# Patient Record
Sex: MALE | NOT HISPANIC OR LATINO | URBAN - METROPOLITAN AREA
[De-identification: names, ages, dates, MRNs, and addresses within clinical notes are randomized per-mention and may not be internally consistent; named-entity substitution may affect disease eponyms.]

---

## 2017-10-28 ENCOUNTER — EMERGENCY (EMERGENCY)
Facility: HOSPITAL | Age: 57
LOS: 1 days | Discharge: HOME | End: 2017-10-28
Attending: EMERGENCY MEDICINE | Admitting: EMERGENCY MEDICINE
Payer: COMMERCIAL

## 2017-10-28 VITALS
OXYGEN SATURATION: 100 % | SYSTOLIC BLOOD PRESSURE: 159 MMHG | HEART RATE: 72 BPM | DIASTOLIC BLOOD PRESSURE: 113 MMHG | RESPIRATION RATE: 18 BRPM | TEMPERATURE: 99 F

## 2017-10-28 VITALS
OXYGEN SATURATION: 99 % | RESPIRATION RATE: 18 BRPM | SYSTOLIC BLOOD PRESSURE: 163 MMHG | HEART RATE: 92 BPM | DIASTOLIC BLOOD PRESSURE: 92 MMHG | TEMPERATURE: 99 F

## 2017-10-28 DIAGNOSIS — Z98.890 OTHER SPECIFIED POSTPROCEDURAL STATES: Chronic | ICD-10-CM

## 2017-10-28 LAB
ALBUMIN SERPL ELPH-MCNC: 4.4 G/DL — SIGNIFICANT CHANGE UP (ref 3.3–5)
ALP SERPL-CCNC: 56 U/L — SIGNIFICANT CHANGE UP (ref 40–120)
ALT FLD-CCNC: 29 U/L RC — SIGNIFICANT CHANGE UP (ref 10–45)
ANION GAP SERPL CALC-SCNC: 11 MMOL/L — SIGNIFICANT CHANGE UP (ref 5–17)
APPEARANCE UR: CLEAR — SIGNIFICANT CHANGE UP
AST SERPL-CCNC: 27 U/L — SIGNIFICANT CHANGE UP (ref 10–40)
BASOPHILS # BLD AUTO: 0 K/UL — SIGNIFICANT CHANGE UP (ref 0–0.2)
BASOPHILS NFR BLD AUTO: 0.2 % — SIGNIFICANT CHANGE UP (ref 0–2)
BILIRUB SERPL-MCNC: 0.4 MG/DL — SIGNIFICANT CHANGE UP (ref 0.2–1.2)
BILIRUB UR-MCNC: NEGATIVE — SIGNIFICANT CHANGE UP
BUN SERPL-MCNC: 16 MG/DL — SIGNIFICANT CHANGE UP (ref 7–23)
CALCIUM SERPL-MCNC: 9.3 MG/DL — SIGNIFICANT CHANGE UP (ref 8.4–10.5)
CHLORIDE SERPL-SCNC: 104 MMOL/L — SIGNIFICANT CHANGE UP (ref 96–108)
CO2 SERPL-SCNC: 27 MMOL/L — SIGNIFICANT CHANGE UP (ref 22–31)
COLOR SPEC: YELLOW — SIGNIFICANT CHANGE UP
CREAT SERPL-MCNC: 0.97 MG/DL — SIGNIFICANT CHANGE UP (ref 0.5–1.3)
DIFF PNL FLD: ABNORMAL
EOSINOPHIL # BLD AUTO: 0 K/UL — SIGNIFICANT CHANGE UP (ref 0–0.5)
EOSINOPHIL NFR BLD AUTO: 0.4 % — SIGNIFICANT CHANGE UP (ref 0–6)
EPI CELLS # UR: SIGNIFICANT CHANGE UP /HPF
GLUCOSE SERPL-MCNC: 109 MG/DL — HIGH (ref 70–99)
GLUCOSE UR QL: NEGATIVE — SIGNIFICANT CHANGE UP
HCT VFR BLD CALC: 42.2 % — SIGNIFICANT CHANGE UP (ref 39–50)
HGB BLD-MCNC: 14.2 G/DL — SIGNIFICANT CHANGE UP (ref 13–17)
KETONES UR-MCNC: NEGATIVE — SIGNIFICANT CHANGE UP
LEUKOCYTE ESTERASE UR-ACNC: NEGATIVE — SIGNIFICANT CHANGE UP
LYMPHOCYTES # BLD AUTO: 1.8 K/UL — SIGNIFICANT CHANGE UP (ref 1–3.3)
LYMPHOCYTES # BLD AUTO: 20.3 % — SIGNIFICANT CHANGE UP (ref 13–44)
MCHC RBC-ENTMCNC: 31.1 PG — SIGNIFICANT CHANGE UP (ref 27–34)
MCHC RBC-ENTMCNC: 33.7 GM/DL — SIGNIFICANT CHANGE UP (ref 32–36)
MCV RBC AUTO: 92.3 FL — SIGNIFICANT CHANGE UP (ref 80–100)
MONOCYTES # BLD AUTO: 0.6 K/UL — SIGNIFICANT CHANGE UP (ref 0–0.9)
MONOCYTES NFR BLD AUTO: 6.9 % — SIGNIFICANT CHANGE UP (ref 2–14)
NEUTROPHILS # BLD AUTO: 6.4 K/UL — SIGNIFICANT CHANGE UP (ref 1.8–7.4)
NEUTROPHILS NFR BLD AUTO: 72.3 % — SIGNIFICANT CHANGE UP (ref 43–77)
NITRITE UR-MCNC: NEGATIVE — SIGNIFICANT CHANGE UP
PH UR: 6 — SIGNIFICANT CHANGE UP (ref 5–8)
PLATELET # BLD AUTO: 176 K/UL — SIGNIFICANT CHANGE UP (ref 150–400)
POTASSIUM SERPL-MCNC: 3.6 MMOL/L — SIGNIFICANT CHANGE UP (ref 3.5–5.3)
POTASSIUM SERPL-SCNC: 3.6 MMOL/L — SIGNIFICANT CHANGE UP (ref 3.5–5.3)
PROT SERPL-MCNC: 7 G/DL — SIGNIFICANT CHANGE UP (ref 6–8.3)
PROT UR-MCNC: SIGNIFICANT CHANGE UP
RBC # BLD: 4.57 M/UL — SIGNIFICANT CHANGE UP (ref 4.2–5.8)
RBC # FLD: 11.7 % — SIGNIFICANT CHANGE UP (ref 10.3–14.5)
RBC CASTS # UR COMP ASSIST: SIGNIFICANT CHANGE UP /HPF (ref 0–2)
SODIUM SERPL-SCNC: 142 MMOL/L — SIGNIFICANT CHANGE UP (ref 135–145)
SP GR SPEC: 1.01 — SIGNIFICANT CHANGE UP (ref 1.01–1.02)
UROBILINOGEN FLD QL: NEGATIVE — SIGNIFICANT CHANGE UP
WBC # BLD: 8.9 K/UL — SIGNIFICANT CHANGE UP (ref 3.8–10.5)
WBC # FLD AUTO: 8.9 K/UL — SIGNIFICANT CHANGE UP (ref 3.8–10.5)
WBC UR QL: SIGNIFICANT CHANGE UP /HPF (ref 0–5)

## 2017-10-28 PROCEDURE — 96375 TX/PRO/DX INJ NEW DRUG ADDON: CPT

## 2017-10-28 PROCEDURE — 74176 CT ABD & PELVIS W/O CONTRAST: CPT | Mod: 26

## 2017-10-28 PROCEDURE — 85027 COMPLETE CBC AUTOMATED: CPT

## 2017-10-28 PROCEDURE — 96374 THER/PROPH/DIAG INJ IV PUSH: CPT

## 2017-10-28 PROCEDURE — 80053 COMPREHEN METABOLIC PANEL: CPT

## 2017-10-28 PROCEDURE — 74176 CT ABD & PELVIS W/O CONTRAST: CPT

## 2017-10-28 PROCEDURE — 99285 EMERGENCY DEPT VISIT HI MDM: CPT

## 2017-10-28 PROCEDURE — 93005 ELECTROCARDIOGRAM TRACING: CPT

## 2017-10-28 PROCEDURE — 99284 EMERGENCY DEPT VISIT MOD MDM: CPT | Mod: 25

## 2017-10-28 PROCEDURE — 81001 URINALYSIS AUTO W/SCOPE: CPT

## 2017-10-28 RX ORDER — ONDANSETRON 8 MG/1
4 TABLET, FILM COATED ORAL ONCE
Qty: 0 | Refills: 0 | Status: COMPLETED | OUTPATIENT
Start: 2017-10-28 | End: 2017-10-28

## 2017-10-28 RX ORDER — KETOROLAC TROMETHAMINE 30 MG/ML
15 SYRINGE (ML) INJECTION ONCE
Qty: 0 | Refills: 0 | Status: DISCONTINUED | OUTPATIENT
Start: 2017-10-28 | End: 2017-10-28

## 2017-10-28 RX ORDER — SODIUM CHLORIDE 9 MG/ML
1000 INJECTION INTRAMUSCULAR; INTRAVENOUS; SUBCUTANEOUS ONCE
Qty: 0 | Refills: 0 | Status: COMPLETED | OUTPATIENT
Start: 2017-10-28 | End: 2017-10-28

## 2017-10-28 RX ADMIN — ONDANSETRON 4 MILLIGRAM(S): 8 TABLET, FILM COATED ORAL at 13:05

## 2017-10-28 RX ADMIN — Medication 15 MILLIGRAM(S): at 13:05

## 2017-10-28 RX ADMIN — SODIUM CHLORIDE 3000 MILLILITER(S): 9 INJECTION INTRAMUSCULAR; INTRAVENOUS; SUBCUTANEOUS at 13:05

## 2017-10-28 NOTE — ED PROVIDER NOTE - MEDICAL DECISION MAKING DETAILS
Resident: right flank, right lower quadrant pain, intermittent. Denies fevers. Likely biliary colic, unlikely appendicitis. CT, labs, fluids, pain control.

## 2017-10-28 NOTE — ED PROVIDER NOTE - OBJECTIVE STATEMENT
Resident: 56y M PMH nephrolithiasis presents with right flank pain, right lower quadrant pain x 2 days. Started yesterday morning when he work up, described as sharp, 10/10 at maximum, intermittent, lasting minutes, associated with nausea, not taking anything for pain. Went to urgent care yesterday, told him urine had blood in it, sent him to emergency department for evaluation. Hx nephrolithiasis 1.5yrs ago, admitted to [facility], followed-up with uro after discharge. Hx Gu[place], pectus excavatum. Patient reports intermittent right arm numbness for past week.    Uro Jareth Mac

## 2017-10-28 NOTE — ED PROVIDER NOTE - PHYSICAL EXAMINATION
Resident: Gen: well appearing, of stated age, no acute distress; Head: NC, AT; ENT: PERRL, MMM; Chest: CTAB, no retractions, rate normal, appears to breathe comfortably; Heart: RRR S1S2 No JVD No peripheral edema b/l pulses 2+ in arms and legs; Abd: Soft, mild right lower quadrant tenderness to palpation, no rebound or guarding, no masses, no modi sign, no mcburney tenderness, no CVAT; Back: No spinal deformity, +right flank tenderness to palpation; Ext: Moving all 4 extremities without obvious impairment to ROM, no obvious weakness; Neuro: fluid speech; Psych: No anxiety, depression or pressured speech noted; Skin: no urticaria, no diffuse rash.

## 2017-10-28 NOTE — ED PROVIDER NOTE - PLAN OF CARE
You may use Tylenol 650mg or Motrin 600mg every 8 hours as needed for pain. For severe pain not controlled with these medications, you may use the prescription pain medication Oxycodone, please do not drink alcohol or drive on this medication as it can make you very sleepy.     Please take the flomax every night until directed to stop by our urologist doctors.     Please strain your urine to help identify the kidney stone when it passes.      Return for pain not controlled with the pain medications, repetitive vomiting or fevers.     Please call the number provided to make a follow up appointment with our urologists in 3-5 days.

## 2017-10-28 NOTE — ED PROVIDER NOTE - NS ED ROS FT
Constitutional: no fever, +chills.  Eyes: no visual changes.  ENMT: no sore throat.  CV: no chest pain.  Resp: no cough, no shortness of breath.  GI: +abdominal pain, +nausea, no vomiting, no diarrhea.  : no dysuria, no hematuria.  MSK: no back pain, no neck pain.  Skin: no rashes.  Endo: no diabetes, no thyroid trouble.

## 2017-10-28 NOTE — ED PROVIDER NOTE - ATTENDING CONTRIBUTION TO CARE
Attending MD Anna:  I personally have seen and examined this patient.  Resident note reviewed and agree on plan of care and except where noted.  See HPI, PE, and MDM for details.       Attending MD Anna: A & O x 3, NAD, EOMI b/l, PERRL b/l; lungs CTAB, heart with reg rhythm without murmur; abdomen soft NTND; mild R CVAT, extremities with no edema; affect appropriate. neuro exam non focal with no motor or sensory deficits.      56M with right flank and back pain, mild CVAT on R on exam, ddx includes ureteral colic, uti/pyelo. Will obtain UA, CT a/p, re-eval

## 2017-10-28 NOTE — ED PROVIDER NOTE - CARE PLAN
Principal Discharge DX:	Kidney stone  Instructions for follow-up, activity and diet:	You may use Tylenol 650mg or Motrin 600mg every 8 hours as needed for pain. For severe pain not controlled with these medications, you may use the prescription pain medication Oxycodone, please do not drink alcohol or drive on this medication as it can make you very sleepy.     Please take the flomax every night until directed to stop by our urologist doctors.     Please strain your urine to help identify the kidney stone when it passes.      Return for pain not controlled with the pain medications, repetitive vomiting or fevers.     Please call the number provided to make a follow up appointment with our urologists in 3-5 days.

## 2017-10-31 ENCOUNTER — INPATIENT (INPATIENT)
Facility: HOSPITAL | Age: 57
LOS: 1 days | Discharge: HOME | DRG: 660 | End: 2017-11-02
Attending: INTERNAL MEDICINE | Admitting: INTERNAL MEDICINE
Payer: COMMERCIAL

## 2017-10-31 VITALS
RESPIRATION RATE: 18 BRPM | DIASTOLIC BLOOD PRESSURE: 97 MMHG | OXYGEN SATURATION: 99 % | SYSTOLIC BLOOD PRESSURE: 183 MMHG | TEMPERATURE: 98 F | WEIGHT: 169.98 LBS | HEART RATE: 85 BPM

## 2017-10-31 DIAGNOSIS — Z98.890 OTHER SPECIFIED POSTPROCEDURAL STATES: Chronic | ICD-10-CM

## 2017-10-31 PROBLEM — Z00.00 ENCOUNTER FOR PREVENTIVE HEALTH EXAMINATION: Status: ACTIVE | Noted: 2017-10-31

## 2017-10-31 PROBLEM — Z00.00 ENCOUNTER FOR PREVENTIVE HEALTH EXAMINATION: Noted: 2017-10-31

## 2017-10-31 LAB
ALBUMIN SERPL ELPH-MCNC: 4.2 G/DL — SIGNIFICANT CHANGE UP (ref 3.3–5)
ALP SERPL-CCNC: 65 U/L — SIGNIFICANT CHANGE UP (ref 40–120)
ALT FLD-CCNC: 23 U/L RC — SIGNIFICANT CHANGE UP (ref 10–45)
ANION GAP SERPL CALC-SCNC: 15 MMOL/L — SIGNIFICANT CHANGE UP (ref 5–17)
APPEARANCE UR: CLEAR — SIGNIFICANT CHANGE UP
AST SERPL-CCNC: 16 U/L — SIGNIFICANT CHANGE UP (ref 10–40)
BASOPHILS # BLD AUTO: 0 K/UL — SIGNIFICANT CHANGE UP (ref 0–0.2)
BASOPHILS NFR BLD AUTO: 0.2 % — SIGNIFICANT CHANGE UP (ref 0–2)
BILIRUB SERPL-MCNC: 0.3 MG/DL — SIGNIFICANT CHANGE UP (ref 0.2–1.2)
BILIRUB UR-MCNC: NEGATIVE — SIGNIFICANT CHANGE UP
BUN SERPL-MCNC: 16 MG/DL — SIGNIFICANT CHANGE UP (ref 7–23)
CALCIUM SERPL-MCNC: 9.4 MG/DL — SIGNIFICANT CHANGE UP (ref 8.4–10.5)
CHLORIDE SERPL-SCNC: 99 MMOL/L — SIGNIFICANT CHANGE UP (ref 96–108)
CO2 SERPL-SCNC: 26 MMOL/L — SIGNIFICANT CHANGE UP (ref 22–31)
COLOR SPEC: SIGNIFICANT CHANGE UP
CREAT SERPL-MCNC: 1.02 MG/DL — SIGNIFICANT CHANGE UP (ref 0.5–1.3)
DIFF PNL FLD: ABNORMAL
EOSINOPHIL # BLD AUTO: 0 K/UL — SIGNIFICANT CHANGE UP (ref 0–0.5)
EOSINOPHIL NFR BLD AUTO: 0.2 % — SIGNIFICANT CHANGE UP (ref 0–6)
GLUCOSE SERPL-MCNC: 113 MG/DL — HIGH (ref 70–99)
GLUCOSE UR QL: NEGATIVE — SIGNIFICANT CHANGE UP
HCT VFR BLD CALC: 41.3 % — SIGNIFICANT CHANGE UP (ref 39–50)
HGB BLD-MCNC: 14.3 G/DL — SIGNIFICANT CHANGE UP (ref 13–17)
KETONES UR-MCNC: NEGATIVE — SIGNIFICANT CHANGE UP
LEUKOCYTE ESTERASE UR-ACNC: NEGATIVE — SIGNIFICANT CHANGE UP
LYMPHOCYTES # BLD AUTO: 1.2 K/UL — SIGNIFICANT CHANGE UP (ref 1–3.3)
LYMPHOCYTES # BLD AUTO: 10.8 % — LOW (ref 13–44)
MCHC RBC-ENTMCNC: 31.4 PG — SIGNIFICANT CHANGE UP (ref 27–34)
MCHC RBC-ENTMCNC: 34.5 GM/DL — SIGNIFICANT CHANGE UP (ref 32–36)
MCV RBC AUTO: 90.9 FL — SIGNIFICANT CHANGE UP (ref 80–100)
MONOCYTES # BLD AUTO: 0.8 K/UL — SIGNIFICANT CHANGE UP (ref 0–0.9)
MONOCYTES NFR BLD AUTO: 7.4 % — SIGNIFICANT CHANGE UP (ref 2–14)
NEUTROPHILS # BLD AUTO: 8.9 K/UL — HIGH (ref 1.8–7.4)
NEUTROPHILS NFR BLD AUTO: 81.5 % — HIGH (ref 43–77)
NITRITE UR-MCNC: NEGATIVE — SIGNIFICANT CHANGE UP
PH UR: 7 — SIGNIFICANT CHANGE UP (ref 5–8)
PLATELET # BLD AUTO: 189 K/UL — SIGNIFICANT CHANGE UP (ref 150–400)
POTASSIUM SERPL-MCNC: 3.8 MMOL/L — SIGNIFICANT CHANGE UP (ref 3.5–5.3)
POTASSIUM SERPL-SCNC: 3.8 MMOL/L — SIGNIFICANT CHANGE UP (ref 3.5–5.3)
PROT SERPL-MCNC: 7.2 G/DL — SIGNIFICANT CHANGE UP (ref 6–8.3)
PROT UR-MCNC: NEGATIVE — SIGNIFICANT CHANGE UP
RBC # BLD: 4.55 M/UL — SIGNIFICANT CHANGE UP (ref 4.2–5.8)
RBC # FLD: 11.6 % — SIGNIFICANT CHANGE UP (ref 10.3–14.5)
RBC CASTS # UR COMP ASSIST: SIGNIFICANT CHANGE UP /HPF (ref 0–2)
SODIUM SERPL-SCNC: 140 MMOL/L — SIGNIFICANT CHANGE UP (ref 135–145)
SP GR SPEC: 1.01 — LOW (ref 1.01–1.02)
UROBILINOGEN FLD QL: NEGATIVE — SIGNIFICANT CHANGE UP
WBC # BLD: 10.9 K/UL — HIGH (ref 3.8–10.5)
WBC # FLD AUTO: 10.9 K/UL — HIGH (ref 3.8–10.5)
WBC UR QL: SIGNIFICANT CHANGE UP /HPF (ref 0–5)

## 2017-10-31 PROCEDURE — 99220: CPT

## 2017-10-31 RX ORDER — HYDROMORPHONE HYDROCHLORIDE 2 MG/ML
0.5 INJECTION INTRAMUSCULAR; INTRAVENOUS; SUBCUTANEOUS ONCE
Qty: 0 | Refills: 0 | Status: DISCONTINUED | OUTPATIENT
Start: 2017-10-31 | End: 2017-10-31

## 2017-10-31 RX ORDER — SODIUM CHLORIDE 9 MG/ML
1000 INJECTION INTRAMUSCULAR; INTRAVENOUS; SUBCUTANEOUS
Qty: 0 | Refills: 0 | Status: DISCONTINUED | OUTPATIENT
Start: 2017-10-31 | End: 2017-11-02

## 2017-10-31 RX ORDER — TAMSULOSIN HYDROCHLORIDE 0.4 MG/1
0.4 CAPSULE ORAL DAILY
Qty: 0 | Refills: 0 | Status: DISCONTINUED | OUTPATIENT
Start: 2017-10-31 | End: 2017-11-02

## 2017-10-31 RX ORDER — OXYCODONE HYDROCHLORIDE 5 MG/1
5 TABLET ORAL EVERY 4 HOURS
Qty: 0 | Refills: 0 | Status: DISCONTINUED | OUTPATIENT
Start: 2017-10-31 | End: 2017-11-02

## 2017-10-31 RX ORDER — KETOROLAC TROMETHAMINE 30 MG/ML
30 SYRINGE (ML) INJECTION ONCE
Qty: 0 | Refills: 0 | Status: DISCONTINUED | OUTPATIENT
Start: 2017-10-31 | End: 2017-10-31

## 2017-10-31 RX ORDER — SODIUM CHLORIDE 9 MG/ML
2000 INJECTION INTRAMUSCULAR; INTRAVENOUS; SUBCUTANEOUS ONCE
Qty: 0 | Refills: 0 | Status: COMPLETED | OUTPATIENT
Start: 2017-10-31 | End: 2017-10-31

## 2017-10-31 RX ORDER — KETOROLAC TROMETHAMINE 30 MG/ML
30 SYRINGE (ML) INJECTION EVERY 6 HOURS
Qty: 0 | Refills: 0 | Status: DISCONTINUED | OUTPATIENT
Start: 2017-10-31 | End: 2017-11-02

## 2017-10-31 RX ORDER — ACETAMINOPHEN 500 MG
1000 TABLET ORAL ONCE
Qty: 0 | Refills: 0 | Status: COMPLETED | OUTPATIENT
Start: 2017-10-31 | End: 2017-10-31

## 2017-10-31 RX ADMIN — HYDROMORPHONE HYDROCHLORIDE 0.5 MILLIGRAM(S): 2 INJECTION INTRAMUSCULAR; INTRAVENOUS; SUBCUTANEOUS at 21:35

## 2017-10-31 RX ADMIN — Medication 400 MILLIGRAM(S): at 21:05

## 2017-10-31 RX ADMIN — Medication 30 MILLIGRAM(S): at 21:35

## 2017-10-31 RX ADMIN — Medication 1000 MILLIGRAM(S): at 21:35

## 2017-10-31 RX ADMIN — HYDROMORPHONE HYDROCHLORIDE 0.5 MILLIGRAM(S): 2 INJECTION INTRAMUSCULAR; INTRAVENOUS; SUBCUTANEOUS at 21:05

## 2017-10-31 RX ADMIN — Medication 30 MILLIGRAM(S): at 21:05

## 2017-10-31 RX ADMIN — SODIUM CHLORIDE 1000 MILLILITER(S): 9 INJECTION INTRAMUSCULAR; INTRAVENOUS; SUBCUTANEOUS at 23:01

## 2017-10-31 NOTE — ED PROVIDER NOTE - ATTENDING CONTRIBUTION TO CARE
Patient presenting with R flank pain, diagnosed with 3mm UVJ stone on CT 3 days ago, not responding to oxycodone at home.  On exam patient very uncomfortable, vital signs within normal limits, benign abdominal exam.  Renal colic failing outpatient management/treatment, will repeat labs, pain control, likely CDU for observation for continued pain control given failure of management to date.

## 2017-10-31 NOTE — ED CDU PROVIDER INITIAL DAY NOTE - OBJECTIVE STATEMENT
56yom pmhx of kidney stone (passed without surgery) seen in the ER 2 days ago for renal colic and dx with 3mm distal stone, sent home on oxycodone, flomax and zofran, returns with c/o worsening right flank pain radiating to right lower quadrant and testicle. pt reports pain initially started 5 days ago and progressively worsened when became severe 2 days ago so came in. +urinary frequency +urinary hesitancy +myalgias +chills  denies fever, diarrhea, dysuria, hematuria

## 2017-10-31 NOTE — ED ADULT NURSE NOTE - OBJECTIVE STATEMENT
55 y/o M presents to the ED c/o R sided flank pain.  Pt was seen in the ED on Saturday and was diagnosed with a kidney stone.  Pt states he was sent home on oxycodone, Flomax and Zofran.  Pt states today the pain was unbearable and states the oxycodone was no longer working.  Pt also mentions the pain was originally in the RLQ, but has since radiated lower.  Pt denies f/c.  Pt has hx of kidney stones, able to pass on his own.  Pt reports pain 10/10 and pt presents uncomfortable.  Patient is A&Ox4. Face is symmetrical. PERRL 3mmB. Speech is clear. Patient is moving all extremities with 5/5 strength and walks with steady gait. No chest pain, shortness of breath, diaphoresis, palpitations, left arm pain, excessive fatigue.  VSS.

## 2017-10-31 NOTE — ED ADULT TRIAGE NOTE - CHIEF COMPLAINT QUOTE
right sided flank pain. diagnosed with 3mm stone saturday, pain has now moved to front.  Pt took oxycodone at 1800 without relief.

## 2017-10-31 NOTE — ED CDU PROVIDER INITIAL DAY NOTE - DETAILS
frequent reeval, vitals q 4hrs, IVF, pain control, flomax, anti-emetic PRN, Urology following  d/w attending

## 2017-10-31 NOTE — ED CDU PROVIDER INITIAL DAY NOTE - ATTENDING CONTRIBUTION TO CARE
Patient with known renal stone, failing outpatient pain control.  Unremarkable physical exam in ED, feeling better after pain medications.  Will continue treating pain in CDU, possible AM urology consult if pain remains uncontrolled.

## 2017-10-31 NOTE — ED ADULT NURSE REASSESSMENT NOTE - NS ED NURSE REASSESS COMMENT FT1
Pt received from PRITI Pizarro. Pt oriented to CDU & plan of care was discussed. Pt complains of 2/10 LRQ pain but states it has improved from before. Pt denies any urinary symptoms. Safety & comfort measures maintained. Call bell in reach. Will continue to monitor.

## 2017-10-31 NOTE — ED PROVIDER NOTE - OBJECTIVE STATEMENT
56yom pmhx of kidney stones seen in the ER 2 days ago for renal colic and dx with 3mm distal stone, sent home on oxycodone, flomax and zofran c/o worsening right flank pain, right lower quadrant pain. No improvement with oxycodone. Hx nephrolithiasis 1.5yrs ago, admitted to [facility], followed-up with uro after discharge. No fever or chills. scheduled urology appt in Nov for followup.

## 2017-11-01 DIAGNOSIS — N23 UNSPECIFIED RENAL COLIC: ICD-10-CM

## 2017-11-01 DIAGNOSIS — N20.0 CALCULUS OF KIDNEY: ICD-10-CM

## 2017-11-01 LAB
CULTURE RESULTS: NO GROWTH — SIGNIFICANT CHANGE UP
SPECIMEN SOURCE: SIGNIFICANT CHANGE UP

## 2017-11-01 PROCEDURE — 99217: CPT

## 2017-11-01 RX ORDER — OXYCODONE AND ACETAMINOPHEN 5; 325 MG/1; MG/1
1 TABLET ORAL EVERY 4 HOURS
Qty: 0 | Refills: 0 | Status: DISCONTINUED | OUTPATIENT
Start: 2017-11-01 | End: 2017-11-02

## 2017-11-01 RX ORDER — ASPIRIN/CALCIUM CARB/MAGNESIUM 324 MG
0 TABLET ORAL
Qty: 0 | Refills: 0 | COMMUNITY

## 2017-11-01 RX ORDER — ACETAMINOPHEN 500 MG
650 TABLET ORAL EVERY 6 HOURS
Qty: 0 | Refills: 0 | Status: DISCONTINUED | OUTPATIENT
Start: 2017-11-01 | End: 2017-11-02

## 2017-11-01 RX ORDER — HEPARIN SODIUM 5000 [USP'U]/ML
5000 INJECTION INTRAVENOUS; SUBCUTANEOUS EVERY 12 HOURS
Qty: 0 | Refills: 0 | Status: DISCONTINUED | OUTPATIENT
Start: 2017-11-01 | End: 2017-11-02

## 2017-11-01 RX ORDER — MORPHINE SULFATE 50 MG/1
2 CAPSULE, EXTENDED RELEASE ORAL EVERY 6 HOURS
Qty: 0 | Refills: 0 | Status: DISCONTINUED | OUTPATIENT
Start: 2017-11-01 | End: 2017-11-02

## 2017-11-01 RX ADMIN — Medication 30 MILLIGRAM(S): at 16:50

## 2017-11-01 RX ADMIN — TAMSULOSIN HYDROCHLORIDE 0.4 MILLIGRAM(S): 0.4 CAPSULE ORAL at 08:25

## 2017-11-01 RX ADMIN — HEPARIN SODIUM 5000 UNIT(S): 5000 INJECTION INTRAVENOUS; SUBCUTANEOUS at 21:08

## 2017-11-01 RX ADMIN — SODIUM CHLORIDE 100 MILLILITER(S): 9 INJECTION INTRAMUSCULAR; INTRAVENOUS; SUBCUTANEOUS at 01:47

## 2017-11-01 RX ADMIN — Medication 30 MILLIGRAM(S): at 08:25

## 2017-11-01 RX ADMIN — Medication 30 MILLIGRAM(S): at 09:05

## 2017-11-01 NOTE — ED CDU PROVIDER DISPOSITION NOTE - PLAN OF CARE
1. Stay hydrated. Continue to strain urine.  2. Take Ibuprofen 600mg every 6hrs for pain as needed with food. Take Oxycodone 5mg every 6hrs for severe pain as needed- Don't drive after. Take Flomax 0.4mg daily. Take Zofran 1 tab every 8hrs for nausea as needed.    3. Follow up with your PCP in 1-2 days and Urology #[phone] in 5-7 days. Bring printed results to your Doctor visits.  4. Return if symptoms worsen, fever, weakness, dizziness, inability to urinate, inability to eat/drink and all other concerns.

## 2017-11-01 NOTE — H&P ADULT - ASSESSMENT
56 m with  kidney stone- IVF, pain control  urology evaluation  Further action as per clinical course   Meliton Palma MD pager [phone]

## 2017-11-01 NOTE — H&P ADULT - NSHPLABSRESULTS_GEN_ALL_CORE
14.3   10.9  )-----------( 189      ( 31 Oct 2017 21:23 )             41.3       10-31    140  |  99  |  16  ----------------------------<  113<H>  3.8   |  26  |  1.02    Ca    9.4      31 Oct 2017 21:23    TPro  7.2  /  Alb  4.2  /  TBili  0.3  /  DBili  x   /  AST  16  /  ALT  23  /  AlkPhos  65  10-31              Urinalysis Basic - ( 31 Oct 2017 22:26 )    Color: PL Yellow / Appearance: Clear / S.008 / pH: x  Gluc: x / Ketone: Negative  / Bili: Negative / Urobili: Negative   Blood: x / Protein: Negative / Nitrite: Negative   Leuk Esterase: Negative / RBC: 3-5 /HPF / WBC 0-2 /HPF   Sq Epi: x / Non Sq Epi: x / Bacteria: x            Lactate Trend            CAPILLARY BLOOD GLUCOSE    < from: CT Abdomen and Pelvis No Cont (10.28.17 @ 13:45) >    IMPRESSION:   1.  Mild right hydroureteronephrosis is secondary to a 3 mm calculus in   the right ureterovesicular junction.  2.  Nonobstructing left lower pole renal calculus.    < end of copied text >

## 2017-11-01 NOTE — ED CDU PROVIDER DISPOSITION NOTE - CLINICAL COURSE
55 y/o M h/o Kidney stone (never required procedure for stone before) p/w R flank pain radiating to lower abdomen and testicle x 3 days. pt was seen 2 days ago here in ED and had CT scan showing 3mm UVJ stone on R side. sent home with meds- flomax, pain control. pt returned yesterday because pain uncontrolled with oxycodone.  patient received pain control, fluids, in ED. Recommended patient to come to CDU for continued pain control, IVF hydration, renal function monitoring and consult by urology. Patient had no acute events overnight and pain was well controlled.   ***to be done by AM PA - Urology came to reassess patient and state patient is stable for d/c at this time with pain control, Flomax, anti-emetics and follow-up in office for continued evaluation. 57 y/o M h/o Kidney stone (never required procedure for stone before) p/w R flank pain radiating to lower abdomen and testicle x 3 days. pt was seen 2 days ago here in ED and had CT scan showing 3mm UVJ stone on R side. sent home with meds- flomax, pain control. pt returned yesterday because pain uncontrolled with oxycodone.  patient received pain control, fluids, in ED. Recommended patient to come to CDU for continued pain control, IVF hydration, renal function monitoring and consult by urology. Patient had no acute events overnight and pain was well controlled. Patient continues to have pain in morning, he reports that it keeps coming back when pain medication wears off. Dr. Gary Goldberg comes to see and evaluate patient stating to admit to Dr. Palma for pain control until urine culture results to determine if procedure necessary. Patient will be admitted at this time to Dr. Palma who is aware.

## 2017-11-01 NOTE — CONSULT NOTE ADULT - ASSESSMENT
small distal stone on right with no signs of infection  admit for iv fluids and analgesics but will consider discharge with medical therapy pending or availability  will require negative urine culture prior to ureteroscopy with stent only if worsening symptoms or signs of infection  findings discussed with pt

## 2017-11-01 NOTE — H&P ADULT - NSHPPHYSICALEXAM_GEN_ALL_CORE
PHYSICAL EXAMINATION:  Vital Signs Last 24 Hrs  T(C): 37.3 (01 Nov 2017 12:14), Max: 37.3 (01 Nov 2017 12:14)  T(F): 99.2 (01 Nov 2017 12:14), Max: 99.2 (01 Nov 2017 12:14)  HR: 80 (01 Nov 2017 12:14) (64 - 95)  BP: 131/84 (01 Nov 2017 12:14) (117/72 - 183/97)  BP(mean): --  RR: 18 (01 Nov 2017 12:14) (17 - 18)  SpO2: 98% (01 Nov 2017 12:14) (96% - 99%)  CAPILLARY BLOOD GLUCOSE          GENERAL: NAD, well-groomed, well-developed  HEAD:  atraumatic, normocephalic  EYES: sclera anicteric  ENMT: mucous membranes moist  NECK: supple, No JVD  CHEST/LUNG: clear to auscultation bilaterally; no rales, rhonchi, or wheezing b/l  HEART: normal S1, S2  ABDOMEN: BS+, soft, ND, R low abdomen tender   EXTREMITIES:  pulses palpable; no clubbing, cyanosis, or edema b/l LEs  NEURO: awake, alert, interactive; moves all extremities  SKIN: no rashes or lesions

## 2017-11-01 NOTE — ED CDU PROVIDER SUBSEQUENT DAY NOTE - HISTORY
No interval changes since initial CDU provider note. Pt's initial renal colic now subsided. Pt feels well without complaint. pain still controlled after meds by ED.  no n/v/d, dysuria. able to urinate without issue. straining urine but has not retained a stone. pt evaluated by Urology and recommend IVF, flomax and pain control. NAD most recent VSS stable.

## 2017-11-01 NOTE — ED CDU PROVIDER SUBSEQUENT DAY NOTE - PROGRESS NOTE DETAILS
Pt feels well without complaint. pain still controlled after meds by ED.  no n/v/d, dysuria. able to urinate without issue. straining urine but has not retained a stone.  NAD most recent VSS stable. Patient continuing to have pain, giving toradol at this time. Dr. Gary Goldberg comes to see and evaluate patient. Stating that he feels patient should be admitted for continued pain control and determination if procedure becomes necessary such as stenting, which will be determined once urine culture comes back. patient in understanding and agreement with plan. patient to be admitted to Dr. Palma. Dr. Palma aware. I have personally performed a face to face diagnostic evaluation on this patient.  I have reviewed the ACP note and agree with the history, exam, and plan of care, except as noted.  History and Exam by me shows  Attn - pt c/o mild RLQ pain with radiation to testicles.  +chills and no rigors or fever.  no dysuria.  Pt to be admitted to Dr. Palma with  consult by Dr. Gary Goldberg.

## 2017-11-01 NOTE — CONSULT NOTE ADULT - SUBJECTIVE AND OBJECTIVE BOX
Urology PA Consult Note    HPI:  56 y.o. M with PMHx of nephrolithiasis presenting with R flank pain radiating to R groin initially presenting 6 days ago. Pt was seen in ED initially on 10/27 after experiencing 10/10 sudden onset of sharp flank pain associated with nausea. Pt was diagnosed with 3mm R UVJ stone and sent home with medical expulsive therapy. Pt presents today after unable to achieve pain control upon taking percocet, admits to dysuria. Currently patient denies fevers/chills, vomiting, hematuria, urinary urgency/frequency, chest pain or SOB.       PAST MEDICAL & SURGICAL HISTORY:  Guillain-[place] syndrome  Kidney stones  History of orthopedic surgery    MEDICATIONS  (STANDING):  sodium chloride 0.9%. 1000 milliLiter(s) (100 mL/Hr) IV Continuous <Continuous>  tamsulosin 0.4 milliGRAM(s) Oral daily    MEDICATIONS  (PRN):  ketorolac   Injectable 30 milliGRAM(s) IV Push every 6 hours PRN Moderate Pain (4 - 6)  oxyCODONE    IR 5 milliGRAM(s) Oral every 4 hours PRN Severe Pain (7 - 10)    FAMILY HISTORY:  Family history of kidney stones (Father)    Allergies    No Known Allergies    Intolerances    REVIEW OF SYSTEMS: Pertinent positives and negatives as stated in HPI, otherwise negative    Vital signs  T(C): 36.8 (10-31-17 @ 23:03), Max: 36.8 (10-31-17 @ 23:03)  HR: 65 (10-31-17 @ 23:03)  BP: 132/83 (10-31-17 @ 23:03)  SpO2: 97% (10-31-17 @ 23:03)  Wt(kg): --    Output  I&O's Summary    UOP    Physical Exam  Gen: NAD, A&O x 3  Pulm: Nonlabored breathing  CV: RRR  Abd: Soft, NT/ND  : No discharge or blood at urethral meatus.  Testes descended bilaterally.  Testes and epididymis nontender bilaterally.  Cremasteric reflex present bilaterally.  Ext: No edema present b/l    LABS:  10-31 @ 21:23    WBC 10.9  / Hct 41.3  / SCr 1.02     10-31    140  |  99  |  16  ----------------------------<  113<H>  3.8   |  26  |  1.02    Ca    9.4      31 Oct 2017 21:23    TPro  7.2  /  Alb  4.2  /  TBili  0.3  /  DBili  x   /  AST  16  /  ALT  23  /  AlkPhos  65  10-31      Urinalysis Basic - ( 31 Oct 2017 22:26 )    Color: PL Yellow / Appearance: Clear / S.008 / pH: x  Gluc: x / Ketone: Negative  / Bili: Negative / Urobili: Negative   Blood: x / Protein: Negative / Nitrite: Negative   Leuk Esterase: Negative / RBC: 3-5 /HPF / WBC 0-2 /HPF   Sq Epi: x / Non Sq Epi: x / Bacteria: x        Cultures: Urine culture pending    RADIOLOGY:  < from: CT Abdomen and Pelvis No Cont (10.28.17 @ 13:45) >  KIDNEYS/URETERS: Mild right hydroureteronephrosis secondary to an   obstructing 3 mm calculus in the right ureterovesicular junction. There   is mild right perinephric stranding. The remainder of the right kidney is   unremarkable.    Within the lower pole the left kidney is a A 4 mm nonobstructing left   renal calculus. The remainder of the left kidney is unremarkable. No   hydronephrosis or hydroureter.    < end of copied text >
HPI  Patient is a 56 y.o. M know to practice with PMHx of nephrolithiasis presenting with R flank pain radiating to R groin initially presenting 6 days ago. Pt was seen in ED initially on 10/27 after experiencing 10/10 sudden onset of sharp flank pain associated with nausea. Pt was diagnosed with 3mm R UVJ stone and sent home with medical expulsive therapy. Pt returned to ns er  unable to achieve pain control upon taking percocet, admits to dysuria but no fever chills + nausea . Patient has passed a stone spontaneously 2 years ago    PAST MEDICAL & SURGICAL HISTORY:  Guillain-[place] syndrome  Kidney stones  History of orthopedic surgery      MEDICATIONS  (STANDING):  sodium chloride 0.9%. 1000 milliLiter(s) (100 mL/Hr) IV Continuous <Continuous>  tamsulosin 0.4 milliGRAM(s) Oral daily    MEDICATIONS  (PRN):  ketorolac   Injectable 30 milliGRAM(s) IV Push every 6 hours PRN Moderate Pain (4 - 6)  oxyCODONE    IR 5 milliGRAM(s) Oral every 4 hours PRN Severe Pain (7 - 10)      FAMILY HISTORY:  Family history of kidney stones (Father)      Allergies    No Known Allergies    Intolerances        SOCIAL HISTORY:  no tobacco etoh     REVIEW OF SYSTEMS: gen weakness  heent neck chest cor msk neuro endo psych skin reviewed and negative  gi n/v  gu per [facility]    Physical Exam  Vital signs  T(C): 37 (17 @ 08:00), Max: 37.1 (17 @ 04:35)  HR: 64 (17 @ 08:00)  BP: 156/92 (17 @ 08:00)  SpO2: 97% (17 @ 08:00)  Wt(kg): --    Output  I&O's Summary        Gen:  wdwn male in mild distress a nd o x 3   heent ncat neck symm supple cor reg chest clear  abd soft nd nt no cvat no tim guarding no hsm  gu normal genitalia 2+   ext no c/ce  LABS:      10-31 @ 21:23    WBC 10.9  / Hct 41.3  / SCr 1.02     10-31    140  |  99  |  16  ----------------------------<  113<H>  3.8   |  26  |  1.02    Ca    9.4      31 Oct 2017 21:23    TPro  7.2  /  Alb  4.2  /  TBili  0.3  /  DBili  x   /  AST  16  /  ALT  23  /  AlkPhos  65  10-31      Urinalysis Basic - ( 31 Oct 2017 22:26 )    Color: PL Yellow / Appearance: Clear / S.008 / pH: x  Gluc: x / Ketone: Negative  / Bili: Negative / Urobili: Negative   Blood: x / Protein: Negative / Nitrite: Negative   Leuk Esterase: Negative / RBC: 3-5 /HPF / WBC 0-2 /HPF   Sq Epi: x / Non Sq Epi: x / Bacteria: x        RADIOLOGY:  3 mm uvj stone  4 mm right renal stone

## 2017-11-01 NOTE — H&P ADULT - HISTORY OF PRESENT ILLNESS
56yom pmhx of kidney stones seen in the ER 2 days ago for renal colic and dx with 3mm distal stone, sent home on oxycodone, flomax and zofran c/o worsening right flank pain, right lower quadrant pain. No improvement with oxycodone. Hx nephrolithiasis 1.5yrs ago, admitted to [facility], followed-up with uro after discharge. No fever or chills. scheduled urology appt in Nov for followup.

## 2017-11-01 NOTE — ED CDU PROVIDER DISPOSITION NOTE - ATTENDING CONTRIBUTION TO CARE
I have personally performed a face to face diagnostic evaluation on this patient.  I have reviewed the ACP note and agree with the history, exam, and plan of care, except as noted.  History and Exam by me shows  Attn - pt c/o mild RLQ pain with radiation to testicles.  +chills and no rigors or fever.  no dysuria.  Pt to be admitted to Dr. Palma with  consult by Dr. Gary Goldberg.

## 2017-11-01 NOTE — CONSULT NOTE ADULT - ASSESSMENT
56 y.o. M presenting with 3mm R UVJ stone  - PO challenge with Dilaudid, oral  - aggressive hydration, fluids/oral  - Strain urine  - Flomax  - follow up in 2 weeks with next available urologist at the [facility] [phone]  - discussed with Dr. Little 56 y.o. M bounceback renal colic from 3mm R distal stone likely migrating in intramural ureter, currently pain controlled, needs at least 1-2 weeks trial of medical expulsive therapy  - PO challenge with Dilaudid PO  - reg diet  - aggressive hydration, fluids/oral  - Strain urine  - Flomax  - no imaging or abx needed at this time  - follow up in 2 weeks with next available urologist at the [facility] [phone]  - discussed with Dr. Little

## 2017-11-01 NOTE — H&P ADULT - NSHPREVIEWOFSYSTEMS_GEN_ALL_CORE
REVIEW OF SYSTEMS:    CONSTITUTIONAL: No weakness, fevers or chills  EYES/ENT: No visual changes;  No vertigo or throat pain   NECK: No pain or stiffness  RESPIRATORY: No cough, wheezing, hemoptysis; No shortness of breath  CARDIOVASCULAR: No chest pain or palpitations  GASTROINTESTINAL: +R lower abdominal pain. No nausea, vomiting, or hematemesis; No diarrhea or constipation. No melena or hematochezia.  GENITOURINARY: No dysuria, frequency or hematuria  NEUROLOGICAL: No numbness or weakness  SKIN: No itching, burning, rashes, or lesions   All other review of systems is negative unless indicated above.

## 2017-11-01 NOTE — H&P ADULT - NSHPSOCIALHISTORY_GEN_ALL_CORE
Social History:    Marital Status:  ( x  )    (   ) Single    (   )    (  )   Occupation:   Lives with: (  ) alone  (  ) children   (x  ) spouse   (  ) parents  (  ) other    Substance Use (street drugs): ( x ) never used  (  ) other:  Tobacco Usage:  (  x ) never smoked   (   ) former smoker   (   ) current smoker  (     ) pack years  (        ) last cigarette date  Alcohol Usage: denies    (     ) Advanced Directives: (     ) None    (      ) DNR    (     ) DNI    (     ) Health Care Proxy:

## 2017-11-02 ENCOUNTER — TRANSCRIPTION ENCOUNTER (OUTPATIENT)
Age: 57
End: 2017-11-02

## 2017-11-02 ENCOUNTER — RESULT REVIEW (OUTPATIENT)
Age: 57
End: 2017-11-02

## 2017-11-02 VITALS
OXYGEN SATURATION: 100 % | DIASTOLIC BLOOD PRESSURE: 98 MMHG | TEMPERATURE: 98 F | HEART RATE: 86 BPM | SYSTOLIC BLOOD PRESSURE: 143 MMHG | RESPIRATION RATE: 20 BRPM

## 2017-11-02 PROBLEM — N20.0 CALCULUS OF KIDNEY: Chronic | Status: ACTIVE | Noted: 2017-10-31

## 2017-11-02 PROBLEM — G61.0 GUILLAIN-BARRE SYNDROME: Chronic | Status: ACTIVE | Noted: 2017-10-31

## 2017-11-02 LAB
ANION GAP SERPL CALC-SCNC: 11 MMOL/L — SIGNIFICANT CHANGE UP (ref 5–17)
BUN SERPL-MCNC: 14 MG/DL — SIGNIFICANT CHANGE UP (ref 7–23)
CALCIUM SERPL-MCNC: 8.2 MG/DL — LOW (ref 8.4–10.5)
CHLORIDE SERPL-SCNC: 103 MMOL/L — SIGNIFICANT CHANGE UP (ref 96–108)
CO2 SERPL-SCNC: 26 MMOL/L — SIGNIFICANT CHANGE UP (ref 22–31)
CREAT SERPL-MCNC: 0.77 MG/DL — SIGNIFICANT CHANGE UP (ref 0.5–1.3)
GLUCOSE SERPL-MCNC: 96 MG/DL — SIGNIFICANT CHANGE UP (ref 70–99)
HCT VFR BLD CALC: 33.2 % — LOW (ref 39–50)
HGB BLD-MCNC: 11.2 G/DL — LOW (ref 13–17)
MCHC RBC-ENTMCNC: 29.7 PG — SIGNIFICANT CHANGE UP (ref 27–34)
MCHC RBC-ENTMCNC: 33.7 GM/DL — SIGNIFICANT CHANGE UP (ref 32–36)
MCV RBC AUTO: 88.1 FL — SIGNIFICANT CHANGE UP (ref 80–100)
PLATELET # BLD AUTO: 145 K/UL — LOW (ref 150–400)
POTASSIUM SERPL-MCNC: 3.9 MMOL/L — SIGNIFICANT CHANGE UP (ref 3.5–5.3)
POTASSIUM SERPL-SCNC: 3.9 MMOL/L — SIGNIFICANT CHANGE UP (ref 3.5–5.3)
RBC # BLD: 3.77 M/UL — LOW (ref 4.2–5.8)
RBC # FLD: 12.7 % — SIGNIFICANT CHANGE UP (ref 10.3–14.5)
SODIUM SERPL-SCNC: 140 MMOL/L — SIGNIFICANT CHANGE UP (ref 135–145)
WBC # BLD: 5.78 K/UL — SIGNIFICANT CHANGE UP (ref 3.8–10.5)
WBC # FLD AUTO: 5.78 K/UL — SIGNIFICANT CHANGE UP (ref 3.8–10.5)

## 2017-11-02 PROCEDURE — 88300 SURGICAL PATH GROSS: CPT | Mod: 26

## 2017-11-02 RX ORDER — TAMSULOSIN HYDROCHLORIDE 0.4 MG/1
0.4 CAPSULE ORAL DAILY
Qty: 0 | Refills: 0 | Status: DISCONTINUED | OUTPATIENT
Start: 2017-11-02 | End: 2017-11-02

## 2017-11-02 RX ORDER — HEPARIN SODIUM 5000 [USP'U]/ML
5000 INJECTION INTRAVENOUS; SUBCUTANEOUS EVERY 12 HOURS
Qty: 0 | Refills: 0 | Status: DISCONTINUED | OUTPATIENT
Start: 2017-11-02 | End: 2017-11-02

## 2017-11-02 RX ORDER — TAMSULOSIN HYDROCHLORIDE 0.4 MG/1
1 CAPSULE ORAL
Qty: 15 | Refills: 0 | OUTPATIENT

## 2017-11-02 RX ORDER — OXYCODONE AND ACETAMINOPHEN 5; 325 MG/1; MG/1
1 TABLET ORAL EVERY 4 HOURS
Qty: 0 | Refills: 0 | Status: DISCONTINUED | OUTPATIENT
Start: 2017-11-02 | End: 2017-11-02

## 2017-11-02 RX ORDER — HYDROMORPHONE HYDROCHLORIDE 2 MG/ML
0.5 INJECTION INTRAMUSCULAR; INTRAVENOUS; SUBCUTANEOUS
Qty: 0 | Refills: 0 | Status: DISCONTINUED | OUTPATIENT
Start: 2017-11-02 | End: 2017-11-02

## 2017-11-02 RX ORDER — ASPIRIN/CALCIUM CARB/MAGNESIUM 324 MG
1 TABLET ORAL
Qty: 0 | Refills: 0 | COMMUNITY

## 2017-11-02 RX ORDER — CEPHALEXIN 500 MG
1 CAPSULE ORAL
Qty: 15 | Refills: 0 | OUTPATIENT
Start: 2017-11-02 | End: 2017-11-07

## 2017-11-02 RX ORDER — MORPHINE SULFATE 50 MG/1
2 CAPSULE, EXTENDED RELEASE ORAL EVERY 6 HOURS
Qty: 0 | Refills: 0 | Status: DISCONTINUED | OUTPATIENT
Start: 2017-11-02 | End: 2017-11-02

## 2017-11-02 RX ORDER — OXYCODONE HYDROCHLORIDE 5 MG/1
5 TABLET ORAL EVERY 4 HOURS
Qty: 0 | Refills: 0 | Status: DISCONTINUED | OUTPATIENT
Start: 2017-11-02 | End: 2017-11-02

## 2017-11-02 RX ORDER — ACETAMINOPHEN 500 MG
650 TABLET ORAL EVERY 6 HOURS
Qty: 0 | Refills: 0 | Status: DISCONTINUED | OUTPATIENT
Start: 2017-11-02 | End: 2017-11-02

## 2017-11-02 RX ORDER — ONDANSETRON 8 MG/1
1 TABLET, FILM COATED ORAL
Qty: 9 | Refills: 0 | OUTPATIENT

## 2017-11-02 RX ORDER — ONDANSETRON 8 MG/1
4 TABLET, FILM COATED ORAL ONCE
Qty: 0 | Refills: 0 | Status: DISCONTINUED | OUTPATIENT
Start: 2017-11-02 | End: 2017-11-02

## 2017-11-02 RX ORDER — OXYCODONE HYDROCHLORIDE 5 MG/1
1 TABLET ORAL
Qty: 16 | Refills: 0 | OUTPATIENT

## 2017-11-02 RX ORDER — KETOROLAC TROMETHAMINE 30 MG/ML
30 SYRINGE (ML) INJECTION EVERY 6 HOURS
Qty: 0 | Refills: 0 | Status: DISCONTINUED | OUTPATIENT
Start: 2017-11-02 | End: 2017-11-02

## 2017-11-02 RX ORDER — SODIUM CHLORIDE 9 MG/ML
1000 INJECTION INTRAMUSCULAR; INTRAVENOUS; SUBCUTANEOUS
Qty: 0 | Refills: 0 | Status: DISCONTINUED | OUTPATIENT
Start: 2017-11-02 | End: 2017-11-02

## 2017-11-02 RX ADMIN — MORPHINE SULFATE 2 MILLIGRAM(S): 50 CAPSULE, EXTENDED RELEASE ORAL at 09:38

## 2017-11-02 RX ADMIN — HEPARIN SODIUM 5000 UNIT(S): 5000 INJECTION INTRAVENOUS; SUBCUTANEOUS at 07:00

## 2017-11-02 NOTE — DISCHARGE NOTE ADULT - PATIENT PORTAL LINK FT
“You can access the Follow[employer] Patient Portal, offered by [facility], by registering with the following website: [email or web address]”

## 2017-11-02 NOTE — PROGRESS NOTE ADULT - ASSESSMENT
patient for ureteroscopy laser litho stone extraction.   r/b/c/l/a of therapy including no treatment were discussed  his questions were all answered to his satisfaction and will proceed with or

## 2017-11-02 NOTE — DISCHARGE NOTE ADULT - MEDICATION SUMMARY - MEDICATIONS TO TAKE
I will START or STAY ON the medications listed below when I get home from the hospital:    oxyCODONE 5 mg oral capsule  -- 1 cap(s) by mouth every 6 hours, As Needed for severe pain MDD:4  -- Caution federal law prohibits the transfer of this drug to any person other  than the person for whom it was prescribed.  It is very important that you take or use this exactly as directed.  Do not skip doses or discontinue unless directed by your doctor.  May cause drowsiness.  Alcohol may intensify this effect.  Use care when operating dangerous machinery.  This prescription cannot be refilled.  Using more of this medication than prescribed may cause serious breathing problems.    -- Indication: For pain    Flomax 0.4 mg oral capsule  -- 1 cap(s) by mouth once a day (at bedtime)   -- It is very important that you take or use this exactly as directed.  Do not skip doses or discontinue unless directed by your doctor.  May cause drowsiness.  Alcohol may intensify this effect.  Use care when operating dangerous machinery.  Some non-prescription drugs may aggravate your condition.  Read all labels carefully.  If a warning appears, check with your doctor before taking.  Swallow whole.  Do not crush.  Take with food or milk.    -- Indication: For bph    Zofran ODT 4 mg oral tablet, disintegrating  -- 1 tab(s) by mouth 3 times a day, As Needed for nausea  -- Indication: For nausea    Keflex 500 mg oral capsule  -- 1 cap(s) by mouth every 8 hours   -- Finish all this medication unless otherwise directed by prescriber.    -- Indication: For prophylaxis    biotin  -- Indication: For supplement

## 2017-11-02 NOTE — DISCHARGE NOTE ADULT - CARE PLAN
Principal Discharge DX:	Kidney stones  Goal:	symptoms resolves  Instructions for follow-up, activity and diet:	S/p Ureteroscopy of right ureter with lithotripsy  11/02/2017  cystoscopy laser lithotripsy and stone extraction stent insertion  Follow up with Dr. Goldberg in 1 week  Follow up with your PMD in 1 week   Continue pain medication  Take keflex for 5 days as prescribed  Continue to hold aspirin until you follow up with your PMD  Please return call your doctor or go to the Emergency room if you have any excessive bleeding, difficulty urinating, worsening pain or fevers.  Secondary Diagnosis:	Renal colic on right side  Instructions for follow-up, activity and diet:	as above

## 2017-11-02 NOTE — DISCHARGE NOTE ADULT - CARE PROVIDERS DIRECT ADDRESSES
,[email or web address]

## 2017-11-02 NOTE — PROGRESS NOTE ADULT - SUBJECTIVE AND OBJECTIVE BOX
Patient is a 56y old  Male who presents with a chief complaint of R flank pain (2017 15:30)      SUBJECTIVE / OVERNIGHT EVENTS: s/p lithotripsy.   Review of Systems  chest pain no  palpitations no  sob no  nausea no  headache no    MEDICATIONS  (STANDING):  heparin  Injectable 5000 Unit(s) SubCutaneous every 12 hours  sodium chloride 0.9%. 1000 milliLiter(s) (100 mL/Hr) IV Continuous <Continuous>  tamsulosin 0.4 milliGRAM(s) Oral daily    MEDICATIONS  (PRN):  acetaminophen   Tablet. 650 milliGRAM(s) Oral every 6 hours PRN Mild Pain (1 - 3)  HYDROmorphone  Injectable 0.5 milliGRAM(s) IV Push every 10 minutes PRN Moderate Pain (4 - 6)  ketorolac   Injectable 30 milliGRAM(s) IV Push every 6 hours PRN Moderate Pain (4 - 6)  morphine  - Injectable 2 milliGRAM(s) IV Push every 6 hours PRN Severe Pain (7 - 10)  ondansetron Injectable 4 milliGRAM(s) IV Push once PRN Nausea and/or Vomiting  oxyCODONE    5 mG/acetaminophen 325 mG 1 Tablet(s) Oral every 4 hours PRN Moderate Pain (4 - 6)  oxyCODONE    IR 5 milliGRAM(s) Oral every 4 hours PRN Severe Pain (7 - 10)          PHYSICAL EXAM:  GENERAL: NAD, well-developed  HEAD:  Atraumatic, Normocephalic  EYES: EOMI, PERRLA, conjunctiva and sclera clear  NECK: Supple, No JVD  CHEST/LUNG: Clear to auscultation bilaterally; No wheeze  HEART: Regular rate and rhythm; No murmurs, rubs, or gallops  ABDOMEN: Soft, Nontender, Nondistended; Bowel sounds present  EXTREMITIES:  2+ Peripheral Pulses, No clubbing, cyanosis, or edema  PSYCH: AAOx3  NEUROLOGY: non-focal  SKIN: No rashes or lesions    LABS:                        11.2   5.78  )-----------( 145      ( 2017 08:33 )             33.2     11-    140  |  103  |  14  ----------------------------<  96  3.9   |  26  |  0.77    Ca    8.2<L>      2017 08:36    TPro  7.2  /  Alb  4.2  /  TBili  0.3  /  DBili  x   /  AST  16  /  ALT  23  /  AlkPhos  65  10-31          Urinalysis Basic - ( 31 Oct 2017 22:26 )    Color: PL Yellow / Appearance: Clear / S.008 / pH: x  Gluc: x / Ketone: Negative  / Bili: Negative / Urobili: Negative   Blood: x / Protein: Negative / Nitrite: Negative   Leuk Esterase: Negative / RBC: 3-5 /HPF / WBC 0-2 /HPF   Sq Epi: x / Non Sq Epi: x / Bacteria: x        RADIOLOGY & ADDITIONAL TESTS:    Imaging Personally Reviewed:    Consultant(s) Notes Reviewed:      Care Discussed with Consultants/Other Providers:

## 2017-11-02 NOTE — BRIEF OPERATIVE NOTE - PROCEDURE
<<-----Click on this checkbox to enter Procedure Ureteroscopy of right ureter with lithotripsy  11/02/2017  cysto laser litho and stone extraction stent insertion  Active  GGOLDBER

## 2017-11-02 NOTE — PROGRESS NOTE ADULT - ASSESSMENT
56 m with  s/p kidney stone lithotripsy- pain control  urology evaluation noted  DC home with close follow with urology in 3 days. QA  Meliton Palma MD pager [phone]

## 2017-11-02 NOTE — DISCHARGE NOTE ADULT - HOSPITAL COURSE
56yom pmhx of kidney stones seen in the ER 2 days ago for renal colic and dx with 3mm distal stone, sent home on oxycodone, flomax and zofran c/o worsening right flank pain, right lower quadrant pain. No improvement with oxycodone.  Urology consulted. S/p Ureteroscopy of right ureter with lithotripsy, cysto laser lithotripsy and stone extraction stent insertion on 11/2. Pt medically cleared for discharge by Medicine and urology. Pt to follow up with PMD and Urology in 1 week. Pt to take Keflex for 5 days and pain medications as needed.

## 2017-11-02 NOTE — DISCHARGE NOTE ADULT - CARE PROVIDER_API CALL
Goldberg, Gary L (MD), Gynecologic Oncology; Obstetrics and Gynecology  [address] [address] [place] [address]  Phone: [phone]  Fax: [phone] Goldberg, Gary D (MD), Urology  [address] [place] [address]  Phone: [phone]  Fax: [phone]

## 2017-11-02 NOTE — DISCHARGE NOTE ADULT - PLAN OF CARE
symptoms resolves S/p Ureteroscopy of right ureter with lithotripsy  11/02/2017  cystoscopy laser lithotripsy and stone extraction stent insertion  Follow up with Dr. Goldberg in 1 week  Follow up with your PMD in 1 week   Continue pain medication  Take keflex for 5 days as prescribed  Continue to hold aspirin until you follow up with your PMD  Please return call your doctor or go to the Emergency room if you have any excessive bleeding, difficulty urinating, worsening pain or fevers. as above

## 2017-11-02 NOTE — PROGRESS NOTE ADULT - SUBJECTIVE AND OBJECTIVE BOX
In discussion with Dr. Goldberg,     if patient discharged today, will require rx for percocet for pain and keflex 500mg po tid x 5 days

## 2017-11-03 PROCEDURE — G0378: CPT

## 2017-11-03 PROCEDURE — 96376 TX/PRO/DX INJ SAME DRUG ADON: CPT

## 2017-11-03 PROCEDURE — C1769: CPT

## 2017-11-03 PROCEDURE — 88300 SURGICAL PATH GROSS: CPT

## 2017-11-03 PROCEDURE — 81001 URINALYSIS AUTO W/SCOPE: CPT

## 2017-11-03 PROCEDURE — 96374 THER/PROPH/DIAG INJ IV PUSH: CPT

## 2017-11-03 PROCEDURE — 96375 TX/PRO/DX INJ NEW DRUG ADDON: CPT

## 2017-11-03 PROCEDURE — C1889: CPT

## 2017-11-03 PROCEDURE — 76000 FLUOROSCOPY <1 HR PHYS/QHP: CPT

## 2017-11-03 PROCEDURE — C2617: CPT

## 2017-11-03 PROCEDURE — 80048 BASIC METABOLIC PNL TOTAL CA: CPT

## 2017-11-03 PROCEDURE — 82365 CALCULUS SPECTROSCOPY: CPT

## 2017-11-03 PROCEDURE — 87086 URINE CULTURE/COLONY COUNT: CPT

## 2017-11-03 PROCEDURE — 80053 COMPREHEN METABOLIC PANEL: CPT

## 2017-11-03 PROCEDURE — 85027 COMPLETE CBC AUTOMATED: CPT

## 2017-11-03 PROCEDURE — 99285 EMERGENCY DEPT VISIT HI MDM: CPT | Mod: 25

## 2017-11-03 PROCEDURE — C1758: CPT

## 2017-11-06 LAB — SURGICAL PATHOLOGY STUDY: SIGNIFICANT CHANGE UP

## 2017-11-07 LAB — COMPN STONE: SIGNIFICANT CHANGE UP

## 2017-11-28 ENCOUNTER — APPOINTMENT (OUTPATIENT)
Dept: UROLOGY | Facility: CLINIC | Age: 57
End: 2017-11-28

## 2018-08-21 NOTE — ED PROVIDER NOTE - NSTIMEPROVIDERCAREINITIATE_GEN_ER
----- Message from Nicole Kumar sent at 8/21/2018  2:21 PM CDT -----  returned call rg lisinopril....[phone] (home)      28-Oct-2017 12:09

## 2018-12-12 NOTE — ED ADULT NURSE NOTE - NSSISCREENINGQ3_ED_A_ED
Preventive Health Recommendations  Male Ages 50 - 64    Yearly exam:             See your health care provider every year in order to  o   Review health changes.   o   Discuss preventive care.    o   Review your medicines if your doctor has prescribed any.     Have a cholesterol test every 5 years, or more frequently if you are at risk for high cholesterol/heart disease.     Have a diabetes test (fasting glucose) every three years. If you are at risk for diabetes, you should have this test more often.     Have a colonoscopy at age 50, or have a yearly FIT test (stool test). These exams will check for colon cancer.      Talk with your health care provider about whether or not a prostate cancer screening test (PSA) is right for you.    You should be tested each year for STDs (sexually transmitted diseases), if you re at risk.     Shots: Get a flu shot each year. Get a tetanus shot every 10 years.     Nutrition:    Eat at least 5 servings of fruits and vegetables daily.     Eat whole-grain bread, whole-wheat pasta and brown rice instead of white grains and rice.     Get adequate Calcium and Vitamin D.     Lifestyle    Exercise for at least 150 minutes a week (30 minutes a day, 5 days a week). This will help you control your weight and prevent disease.     Limit alcohol to one drink per day.     No smoking.     Wear sunscreen to prevent skin cancer.     See your dentist every six months for an exam and cleaning.     See your eye doctor every 1 to 2 years.     No

## 2023-02-02 NOTE — ED ADULT NURSE NOTE - CAS EDN DISCHARGE INTERVENTIONS
Pt had a seizure from 1424-2100. Pt turned on side, mouth suctioned and O2 satt at 96% on RA   IV discontinued, cath removed intact